# Patient Record
Sex: MALE | Race: OTHER | HISPANIC OR LATINO | URBAN - METROPOLITAN AREA
[De-identification: names, ages, dates, MRNs, and addresses within clinical notes are randomized per-mention and may not be internally consistent; named-entity substitution may affect disease eponyms.]

---

## 2018-04-09 ENCOUNTER — OUTPATIENT (OUTPATIENT)
Dept: OUTPATIENT SERVICES | Facility: HOSPITAL | Age: 38
LOS: 1 days | Discharge: HOME | End: 2018-04-09

## 2018-04-11 DIAGNOSIS — K04.7 PERIAPICAL ABSCESS WITHOUT SINUS: ICD-10-CM

## 2021-08-10 ENCOUNTER — EMERGENCY (EMERGENCY)
Facility: HOSPITAL | Age: 41
LOS: 0 days | Discharge: HOME | End: 2021-08-10
Attending: EMERGENCY MEDICINE | Admitting: EMERGENCY MEDICINE
Payer: COMMERCIAL

## 2021-08-10 VITALS
WEIGHT: 199.96 LBS | OXYGEN SATURATION: 98 % | TEMPERATURE: 98 F | DIASTOLIC BLOOD PRESSURE: 86 MMHG | RESPIRATION RATE: 17 BRPM | HEART RATE: 97 BPM | SYSTOLIC BLOOD PRESSURE: 133 MMHG
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
## 2021-08-10 DIAGNOSIS — M54.5 LOW BACK PAIN: ICD-10-CM

## 2021-08-10 DIAGNOSIS — R42 DIZZINESS AND GIDDINESS: ICD-10-CM

## 2021-08-10 DIAGNOSIS — Z98.890 OTHER SPECIFIED POSTPROCEDURAL STATES: ICD-10-CM

## 2021-08-10 DIAGNOSIS — Z87.19 PERSONAL HISTORY OF OTHER DISEASES OF THE DIGESTIVE SYSTEM: ICD-10-CM

## 2021-08-10 DIAGNOSIS — M54.2 CERVICALGIA: ICD-10-CM

## 2021-08-10 DIAGNOSIS — M54.6 PAIN IN THORACIC SPINE: ICD-10-CM

## 2021-08-10 PROCEDURE — 99284 EMERGENCY DEPT VISIT MOD MDM: CPT

## 2021-08-10 PROCEDURE — 72070 X-RAY EXAM THORAC SPINE 2VWS: CPT | Mod: 26

## 2021-08-10 PROCEDURE — 71046 X-RAY EXAM CHEST 2 VIEWS: CPT | Mod: 26

## 2021-08-10 RX ORDER — KETOROLAC TROMETHAMINE 30 MG/ML
30 SYRINGE (ML) INJECTION ONCE
Refills: 0 | Status: DISCONTINUED | OUTPATIENT
Start: 2021-08-10 | End: 2021-08-10

## 2021-08-10 RX ORDER — METHOCARBAMOL 500 MG/1
2 TABLET, FILM COATED ORAL
Qty: 30 | Refills: 0
Start: 2021-08-10 | End: 2021-08-14

## 2021-08-10 RX ORDER — METHOCARBAMOL 500 MG/1
1000 TABLET, FILM COATED ORAL ONCE
Refills: 0 | Status: COMPLETED | OUTPATIENT
Start: 2021-08-10 | End: 2021-08-10

## 2021-08-10 RX ORDER — IBUPROFEN 200 MG
1 TABLET ORAL
Qty: 20 | Refills: 0
Start: 2021-08-10 | End: 2021-08-14

## 2021-08-10 RX ADMIN — METHOCARBAMOL 1000 MILLIGRAM(S): 500 TABLET, FILM COATED ORAL at 20:02

## 2021-08-10 RX ADMIN — Medication 30 MILLIGRAM(S): at 20:02
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
## 2021-08-10 NOTE — ED PROVIDER NOTE - PATIENT PORTAL LINK FT
You can access the FollowMyHealth Patient Portal offered by [facility] by registering at the following website: [email or web address]. By joining [employer]’s FollowMyHealth portal, you will also be able to view your health information using other applications (apps) compatible with our system.
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
## 2021-08-10 NOTE — ED PROVIDER NOTE - PHYSICAL EXAMINATION
GENERAL: Well-nourished, Well-developed. NAD.  HEAD: NCAT  Eyes: PERRLA, EOMI. No asymmetry. No nystagmus. No conjunctival injection. Non-icteric sclera.  ENMT: MMM.   Neck: Supple. No cervical midline TTP. (+)R paravertebral TTP to traps. FROM  CVS: No JVD. No reproducible chest wall tenderness. Normal S1,S2. No murmurs appreciated on auscultation   RESP: No use of accessory muscles. Chest rise symmetrical with good expansion. Lungs clear to auscultation B/L. No wheezing, rales, or rhonchi auscultated.  GI: Normal auscultation of bowel sounds in all 4 quadrants. Soft, Nontender, Nondistended. No guarding or rebound tenderness. No CVAT B/L.  MSK: No midline spinal TTP. FROM of back with flexion and extension. (+)L sided paraspinal lumbar ttp.  Skin: Warm, Dry. No rashes or lesions. Good cap refill < 2 sec B/L. no seatbelt sign  EXT: Radial and pedal pulses present B/L. No calf tenderness or swelling B/L. No palpable cords. No pedal edema B/L.  Neuro: AA&O x 3. Sensation grossly intact. Strength 5/5 B/L. Gait within normal limits.

## 2021-08-10 NOTE — ED PROVIDER NOTE - NSFOLLOWUPCLINICS_GEN_ALL_ED_FT
[facility] ([facility])  Rehabilitation  [address] [place] [address]  Phone: [phone]  Fax:   Follow Up Time: 4-6 Days
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
## 2021-12-01 ENCOUNTER — OUTPATIENT (OUTPATIENT)
Dept: OUTPATIENT SERVICES | Facility: HOSPITAL | Age: 41
LOS: 1 days | Discharge: HOME | End: 2021-12-01

## 2021-12-08 ENCOUNTER — OUTPATIENT (OUTPATIENT)
Dept: OUTPATIENT SERVICES | Facility: HOSPITAL | Age: 41
LOS: 1 days | Discharge: HOME | End: 2021-12-08
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
## 2025-04-07 ENCOUNTER — EMERGENCY (EMERGENCY)
Facility: HOSPITAL | Age: 45
LOS: 0 days | Discharge: HOME | End: 2025-04-07
Attending: EMERGENCY MEDICINE
Payer: MEDICAID

## 2025-04-07 VITALS
HEART RATE: 75 BPM | DIASTOLIC BLOOD PRESSURE: 70 MMHG | OXYGEN SATURATION: 96 % | SYSTOLIC BLOOD PRESSURE: 101 MMHG | TEMPERATURE: 98 F | RESPIRATION RATE: 18 BRPM
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
## 2025-04-07 DIAGNOSIS — M25.562 PAIN IN LEFT KNEE: ICD-10-CM
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
## 2025-04-07 DIAGNOSIS — M25.561 PAIN IN RIGHT KNEE: ICD-10-CM

## 2025-04-07 DIAGNOSIS — M25.552 PAIN IN LEFT HIP: ICD-10-CM

## 2025-04-07 DIAGNOSIS — M54.50 LOW BACK PAIN, UNSPECIFIED: ICD-10-CM

## 2025-04-07 PROCEDURE — 73590 X-RAY EXAM OF LOWER LEG: CPT | Mod: 50

## 2025-04-07 PROCEDURE — 73590 X-RAY EXAM OF LOWER LEG: CPT | Mod: 26,50

## 2025-04-07 PROCEDURE — 73564 X-RAY EXAM KNEE 4 OR MORE: CPT | Mod: 50

## 2025-04-07 PROCEDURE — 73552 X-RAY EXAM OF FEMUR 2/>: CPT | Mod: 26,50

## 2025-04-07 PROCEDURE — 99284 EMERGENCY DEPT VISIT MOD MDM: CPT

## 2025-04-07 PROCEDURE — 73502 X-RAY EXAM HIP UNI 2-3 VIEWS: CPT | Mod: 26,LT

## 2025-04-07 PROCEDURE — 73502 X-RAY EXAM HIP UNI 2-3 VIEWS: CPT | Mod: LT

## 2025-04-07 PROCEDURE — 99284 EMERGENCY DEPT VISIT MOD MDM: CPT | Mod: 25

## 2025-04-07 PROCEDURE — 73552 X-RAY EXAM OF FEMUR 2/>: CPT | Mod: 50

## 2025-04-07 PROCEDURE — 73564 X-RAY EXAM KNEE 4 OR MORE: CPT | Mod: 26,50

## 2025-04-07 NOTE — ED PROVIDER NOTE - CARE PLAN
Principal Discharge DX:	Knee pain  Secondary Diagnosis:	Back pain   1 Principal Discharge DX:	Knee pain  Secondary Diagnosis:	Back pain  Secondary Diagnosis:	Fall

## 2025-04-07 NOTE — ED PROVIDER NOTE - PHYSICAL EXAMINATION
CONSTITUTIONAL: well developed, nontoxic appearing, in no acute distress, speaking in full sentences  SKIN: warm, dry, no rash  HEENT: normocephalic, no conjunctival erythema, moist mucous membranes, patent airway  NECK: supple  CV:  regular rate  RESP: normal work of breathing  ABD: nondistended  MSK: moves all extremities, no cyanosis, no edema. ttp to bilateral knees. full, but painful rom. no joint laxity. drawer testing negative. patient able to bear weight on joints. L. hip ttp.  NEURO: alert, oriented, grossly unremarkable  PSYCH: cooperative, appropriate

## 2025-04-07 NOTE — ED PROVIDER NOTE - CLINICAL SUMMARY MEDICAL DECISION MAKING FREE TEXT BOX
44 y.o. M, no PMHx, presents to the ED for bilateral knee and back pain. Patient states that he tripped at the bottom of the staircase, falling down 4 stairs, and landing on both of his knees on a tile floor. Patient denies any HT/LOC. No headache, neck pain, CP/SOB/abdominal pain. Patient was immediately ambulatory after fall. Comes in to the ED c/o b/l knee pain, left lower back and left hip pain. Did not take anything at home for pain. On exam, pt in NAD, AAOx3, head NC/AT, CN II-XII intact, PEERL, EOMi, neck (-) midline tenderness, lungs CTA B/L, CV S1S2 regular, abdomen soft/NT/ND/(+)BS, back (-) midline tenderness, (+) mild left lower back paraspinal tenderness, (+) soreness to left hip, (-) deformity, FROM of B/L hips, (+) TTP over b/l knee, mostly over patella and lateral to patella, stable joints, (-) laxity, (-) bruising, FROM of B/L knees/ankles, motor 5/5x4, sensation intact, ambulating with steady gait. XR (-) fractures. Most likely MSK pain/bruising. NSAIDS for pain. Ortho follow up. Will d/c.

## 2025-04-07 NOTE — ED ADULT TRIAGE NOTE - RESPIRATORY RATE (BREATHS/MIN)
17
Use debrox ear drops two times per day for three days.  Follow up with a pediatrician within 48-72 hours for further evaluation of ear infection.  Return to the ED if your child is unable to drink, has shortness of breath, if fevers last for greater than 1 week, or if any other concerning symptoms arise.       Fever in Children    WHAT YOU NEED TO KNOW:    A fever is an increase in your child's body temperature. Normal body temperature is 98.6°F (37°C). Fever is generally defined as greater than 100.4°F (38°C). A fever is usually a sign that your child's body is fighting an infection caused by a virus. The cause of your child's fever may not be known. A fever can be serious in young children.    DISCHARGE INSTRUCTIONS:    Seek care immediately if:    Your child's temperature reaches 105°F (40.6°C).    Your child has a dry mouth, cracked lips, or cries without tears.     Your baby has a dry diaper for at least 8 hours, or he or she is urinating less than usual.    Your child is less alert, less active, or is acting differently than he or she usually does.    Your child has a seizure or has abnormal movements of the face, arms, or legs.    Your child is drooling and not able to swallow.    Your child has a stiff neck, severe headache, confusion, or is difficult to wake.    Your child has a fever for longer than 5 days.    Your child is crying or irritable and cannot be soothed.    Contact your child's healthcare provider if:    Your child's ear or forehead temperature is higher than 100.4°F (38°C).    Your child's oral or pacifier temperature is higher than 100°F (37.8°C).    Your child's armpit temperature is higher than 99°F (37.2°C).    Your child's fever lasts longer than 3 days.    You have questions or concerns about your child's fever.    Medicines: Your child may need any of the following:    Acetaminophen decreases pain and fever. It is available without a doctor's order. Ask how much to give your child and how often to give it. Follow directions. Read the labels of all other medicines your child uses to see if they also contain acetaminophen, or ask your child's doctor or pharmacist. Acetaminophen can cause liver damage if not taken correctly.    NSAIDs, such as ibuprofen, help decrease swelling, pain, and fever. This medicine is available with or without a doctor's order. NSAIDs can cause stomach bleeding or kidney problems in certain people. If your child takes blood thinner medicine, always ask if NSAIDs are safe for him. Always read the medicine label and follow directions. Do not give these medicines to children under 6 months of age without direction from your child's healthcare provider.    Do not give aspirin to children under 18 years of age. Your child could develop Reye syndrome if he takes aspirin. Reye syndrome can cause life-threatening brain and liver damage. Check your child's medicine labels for aspirin, salicylates, or oil of wintergreen.    Give your child's medicine as directed. Contact your child's healthcare provider if you think the medicine is not working as expected. Tell him or her if your child is allergic to any medicine. Keep a current list of the medicines, vitamins, and herbs your child takes. Include the amounts, and when, how, and why they are taken. Bring the list or the medicines in their containers to follow-up visits. Carry your child's medicine list with you in case of an emergency.    Temperature that is a fever in children:    An ear or forehead temperature of 100.4°F (38°C) or higher    An oral or pacifier temperature of 100°F (37.8°C) or higher    An armpit temperature of 99°F (37.2°C) or higher    The best way to take your child's temperature: The following are guidelines based on a child's age. Ask your child's healthcare provider about the best way to take your child's temperature.    If your baby is 3 months or younger, take the temperature in his or her armpit.    If your child is 3 months to 5 years, use an electronic pacifier temperature, depending on his or her age. After age 6 months, you can also take an ear, armpit, or forehead temperature.    If your child is 5 years or older, take an oral, ear, or forehead temperature.    Make your child more comfortable while he or she has a fever:    Give your child more liquids as directed. A fever makes your child sweat. This can increase his or her risk for dehydration. Liquids can help prevent dehydration.  Help your child drink at least 6 to 8 eight-ounce cups of clear liquids each day. Give your child water, juice, or broth. Do not give sports drinks to babies or toddlers.    Ask your child's healthcare provider if you should give your child an oral rehydration solution (ORS) to drink. An ORS has the right amounts of water, salts, and sugar your child needs to replace body fluids.    If you are breastfeeding or feeding your child formula, continue to do so. Your baby may not feel like drinking his or her regular amounts with each feeding. If so, feed him or her smaller amounts more often.    Dress your child in lightweight clothes. Shivers may be a sign that your child's fever is rising. Do not put extra blankets or clothes on him or her. This may cause his or her fever to rise even higher. Dress your child in light, comfortable clothing. Cover him or her with a lightweight blanket or sheet. Change your child's clothes, blanket, or sheets if they get wet.    Cool your child safely. Use a cool compress or give your child a bath in cool or lukewarm water. Your child's fever may not go down right away after his or her bath. Wait 30 minutes and check his or her temperature again. Do not put your child in a cold water or ice bath.    Follow up with your child's healthcare provider as directed: Write down your questions so you remember to ask them during your child's visits.

## 2025-04-07 NOTE — ED ADULT NURSE NOTE - NSFALLUNIVINTERV_ED_ALL_ED
14
Bed/Stretcher in lowest position, wheels locked, appropriate side rails in place/Call bell, personal items and telephone in reach/Instruct patient to call for assistance before getting out of bed/chair/stretcher/Non-slip footwear applied when patient is off stretcher/[place] to call system/Physically safe environment - no spills, clutter or unnecessary equipment/Purposeful proactive rounding/Room/bathroom lighting operational, light cord in reach

## 2025-04-07 NOTE — ED PROVIDER NOTE - PATIENT PORTAL LINK FT
You can access the FollowMyHealth Patient Portal offered by [facility] by registering at the following website: [email or web address]. By joining [employer]’s FollowMyHealth portal, you will also be able to view your health information using other applications (apps) compatible with our system.

## 2025-04-07 NOTE — ED PROVIDER NOTE - NSFOLLOWUPINSTRUCTIONS_ED_ALL_ED_FT
Our Emergency Department Referral Coordinators will be reaching out to you in the next 24-48 hours from 9:00am to 5:00pm (Monday to Friday) with a follow up appointment. Please expect a phone call from the hospital in that time frame. If you do not receive a call or if you have any questions or concerns, you can reach them at [phone]      Musculoskeletal Pain    WHAT YOU NEED TO KNOW:    What do I need to know about musculoskeletal pain? Musculoskeletal pain can occur in muscles, bones, joints, ligaments, tendons, or nerves. The pain can be dull, achy, or sharp. You may have pain and tenderness to the touch as well. The pain can occur anywhere in your body. Musculoskeletal pain can be from an injury, surgery, or a medical condition such as polymyositis.    How is the cause of musculoskeletal pain diagnosed? Your healthcare provider will ask about past medical conditions or injuries. Your provider may touch, press, bend, stretch, or move the painful area. Tell your provider when the pain started and if the pain is constant or comes and goes. Tell your provider if the pain is dull, sharp, or achy. Also tell your provider if the pain wakes you from sleep. You may also need any of the following tests:    X-ray, MRI, or CT scan pictures may show an injury or health condition that is causing your pain. Contrast liquid may be given to help the area show up better in the pictures. Tell the healthcare provider if you have ever had an allergic reaction to contrast liquid. Do not enter the MRI room with anything metal. Metal can cause serious injury. Tell the provider if you have any metal in or on your body.    Ultrasound pictures may show problems in your muscles or tissues that are causing your pain.  How is musculoskeletal pain treated?    NSAIDs help decrease swelling and pain or fever. This medicine is available with or without a doctor's order. NSAIDs can cause stomach bleeding or kidney problems in certain people. If you take blood thinner medicine, always ask your healthcare provider if NSAIDs are safe for you. Always read the medicine label and follow directions.    Acetaminophen decreases pain and fever. It is available without a doctor's order. Ask how much to take and how often to take it. Follow directions. Read the labels of all other medicines you are using to see if they also contain acetaminophen, or ask your doctor or pharmacist. Acetaminophen can cause liver damage if not taken correctly.    Muscle relaxers help relax your muscles to decrease pain and muscle spasms.    Steroids may be given to decrease redness, pain, and swelling.    A pain cream, gel, or patch may be applied to your skin on painful areas.  What can I do to manage my symptoms?    Rest as directed. Avoid activity that causes pain. You may be able to return to normal activity when you can move without pain. Follow directions for rest and activity.    Ice the painful area to decrease pain and swelling. Use an ice pack, or put ice in a plastic bag and cover it with a towel. Always put a cloth between the ice and your skin. Apply the ice as often as directed for the first 24 to 48 hours.    Apply heat to the area as directed. Heat helps decrease pain and muscle spasms. Your healthcare provider will tell you when and how often to apply heat.    Apply compression to the area, if directed. Your provider may want you to use a splint, brace, or elastic bandage. Compression helps decrease pain and swelling. A splint, brace, or bandage will also help protect the painful area when you move around.  How to Wrap an Elastic Bandage      Elevate (raise) the painful area to reduce swelling and pain. Use pillows, blankets, or rolled towels to elevate the area above the level of your heart. Elevate the area as often as you can.    Elevate Leg      Ask your provider if alternative therapies are right for you. Examples include acupuncture, relaxation, and massage. These therapies may help reduce pain.  When should I seek immediate care?    You have severe pain when you move the area.    You lose feeling in the area.    You have new or worse pain or swelling in the area. Your skin may feel tight.  When should I call my doctor?    You have a fever.    You have pain that does not get better with treatment.    You have trouble sleeping because of your pain.    Your painful area becomes more tender, red, and warm to the touch.    You have less movement of the painful area.    You have questions or concerns about your condition or care.

## 2025-04-07 NOTE — ED PROVIDER NOTE - NSPTACCESSSVCSAPPTDETAILS_ED_ALL_ED_FT
please accommodate with appointment. knee pain, xrays negative for acute fracture, may benefit from outpatient diagnostic intensity for possible tibial plateau.

## 2025-04-07 NOTE — ED PROVIDER NOTE - CONSIDERATION OF ADMISSION OBSERVATION
admission was considered but pt has no fractures. Feels better after meds. Ambulating without difficulty. Consideration of Admission/Observation

## 2025-04-07 NOTE — ED PROVIDER NOTE - PROGRESS NOTE DETAILS
ALTON FRASER:  Patient evaluated as per HPI and PE  Patient with mechanical trip and fall down stairs. No HT, LOC, or Amnesia to Event. Immediately ambulatory afterwards.  PE remarkable for ttp of bilateral kness and L. hip. Patient able to bear weight on joints and no gait instability.  Plan: Xrays, [facility].  Will revaluate. ALTON FRASER:  Xrays reviewed as unremarkable. No acute fracture.  Will discharge patient with discharge instructions, return precautions, and ortho follow-up.

## 2025-04-07 NOTE — ED PROVIDER NOTE - OBJECTIVE STATEMENT
44M with no PMHx who presets to the ED for bilateral knee and back pain. Patient refers that he was at home today when he tripped at the bottom of the staircase, falling down 4 stairs, and landing on his bilateral knees on a tile floor. Patient denies any HT, LOC, or Amnesia to the event. Patient was immediately ambulatory and decided to come to the ED to be evaluated because he was in significant pain and was worried that he may have a fracture. Patient also referring L. Posterior Hip pain. Patient is able to ambulate without assistance or gait instability. Otherwise denies fevers, headache, chills, cp, sob, n/v/d, abd pain, changes in urinary/stool habitus, calf tenderness or swelling, recent travel. Does not take any routine medications. Routine vaccinations up-to-date.

## 2025-04-09 NOTE — CHART NOTE - NSCHARTNOTEFT_GEN_A_CORE
1  -  4/8 [facility]-  4/9 / appt scheduled on 05/14/2025 @ 1:30 PM @ [address] 4/9 - KIAN (Ortho Referral)

## 2025-04-30 PROBLEM — Z00.00 ENCOUNTER FOR PREVENTIVE HEALTH EXAMINATION: Status: ACTIVE | Noted: 2025-04-30

## 2025-05-14 ENCOUNTER — APPOINTMENT (OUTPATIENT)
Dept: ORTHOPEDIC SURGERY | Facility: CLINIC | Age: 45
End: 2025-05-14